# Patient Record
Sex: MALE | Race: WHITE | NOT HISPANIC OR LATINO | Employment: STUDENT | URBAN - METROPOLITAN AREA
[De-identification: names, ages, dates, MRNs, and addresses within clinical notes are randomized per-mention and may not be internally consistent; named-entity substitution may affect disease eponyms.]

---

## 2024-09-10 ENCOUNTER — OFFICE VISIT (OUTPATIENT)
Dept: FAMILY MEDICINE CLINIC | Facility: CLINIC | Age: 24
End: 2024-09-10
Payer: COMMERCIAL

## 2024-09-10 VITALS
HEIGHT: 74 IN | BODY MASS INDEX: 24.77 KG/M2 | HEART RATE: 80 BPM | WEIGHT: 193 LBS | DIASTOLIC BLOOD PRESSURE: 74 MMHG | SYSTOLIC BLOOD PRESSURE: 116 MMHG | OXYGEN SATURATION: 98 % | TEMPERATURE: 98.2 F

## 2024-09-10 DIAGNOSIS — R07.89 MUSCULOSKELETAL CHEST PAIN: Primary | ICD-10-CM

## 2024-09-10 DIAGNOSIS — M99.08 RIB CAGE DYSFUNCTION: ICD-10-CM

## 2024-09-10 PROCEDURE — 99203 OFFICE O/P NEW LOW 30 MIN: CPT | Performed by: FAMILY MEDICINE

## 2024-09-10 RX ORDER — METHYLPREDNISOLONE 4 MG
TABLET, DOSE PACK ORAL
COMMUNITY
Start: 2024-06-11 | End: 2024-09-10

## 2024-09-10 RX ORDER — TRIAMCINOLONE ACETONIDE 5 MG/G
CREAM TOPICAL
COMMUNITY
Start: 2024-06-12 | End: 2024-09-10

## 2024-09-10 NOTE — ASSESSMENT & PLAN NOTE
I suspect patient's chest pain is likely musculoskeletal in nature.  It does not worsen with exertion and only occurs with certain movements.  Pain could be reproduced in clinic today with overhead movements.  I suspect pain secondary to pectoral muscle strain or rib dysfunction.  Patient can try ibuprofen and Tylenol as needed for pain control.  Patient was given referral to physical therapy and a chest x-ray was ordered for further evaluation and treatment.  RTC/ED precautions given.

## 2024-09-10 NOTE — PROGRESS NOTES
MAVERICK Voss is a 23 y.o. male who presents today to establish care.    Chief Complaint   Patient presents with    Establish Care    Chest Pain     Rib a sternum         Patient is here to establish care. He has been seeing pediatrician but has not been seen in a couple of years other than at [facility]. He had seizures as a kid but has not had one since he was 9 years old. He has been having chest pain since July. Pain is aching in nature and does not radiate. It occurs along the sternum worse on the left than the right. He will randomly get pain with doing push ups, raising hands over his head, and if he sleeps on his chest. Pain does not worsen with deep breaths. It started after he was moving a bunch of bags of quickcrete but does not recall an injury. Pain is not constant and only occurs with these movements. It has not gotten better or worse. It does not occur at rest and physical exertion does not cause or worsen pain unless he is doing a push up motion or has hands over head. Sometimes when he lifts hands above head and stretches backward it will pop and the pain will go away. He denies bruising, SOA, palpitations, weakness, N/V, wheezing, HA, or dizziness. He has not taken any medications for this. He has just been avoiding movements that cause the pain. He has no remote injury to sternum or rib cage. No history history of heart disease and does not have a strong family history of heart disease.       Review of Systems   Constitutional:  Negative for fever and unexpected weight loss.   HENT:  Negative for congestion, ear pain and sore throat.    Eyes:  Negative for visual disturbance.   Respiratory:  Negative for cough, chest tightness, shortness of breath and wheezing.    Cardiovascular:  Positive for chest pain. Negative for palpitations and leg swelling.   Gastrointestinal:  Negative for abdominal pain, blood in stool, constipation, diarrhea, nausea, vomiting and GERD.   Endocrine: Negative for polydipsia  and polyuria.   Genitourinary:  Negative for difficulty urinating.   Musculoskeletal:  Negative for joint swelling.   Skin:  Negative for rash and skin lesions.   Allergic/Immunologic: Negative for environmental allergies.   Neurological:  Negative for seizures and syncope.   Hematological:  Does not bruise/bleed easily.   Psychiatric/Behavioral:  Negative for suicidal ideas.         PHQ-9 Depression Screening  Little interest or pleasure in doing things? 0-->not at all   Feeling down, depressed, or hopeless? 0-->not at all   Trouble falling or staying asleep, or sleeping too much?     Feeling tired or having little energy?     Poor appetite or overeating?     Feeling bad about yourself - or that you are a failure or have let yourself or your family down?     Trouble concentrating on things, such as reading the newspaper or watching television?     Moving or speaking so slowly that other people could have noticed? Or the opposite - being so fidgety or restless that you have been moving around a lot more than usual?     Thoughts that you would be better off dead, or of hurting yourself in some way?     PHQ-9 Total Score 0   If you checked off any problems, how difficult have these problems made it for you to do your work, take care of things at home, or get along with other people?         Past Medical History:   Diagnosis Date    Seizures         History reviewed. No pertinent surgical history.     Family History   Problem Relation Age of Onset    No Known Problems Mother     Atrial fibrillation Father     No Known Problems Sister     Diabetes Maternal Grandmother     Other (Other) Maternal Grandfather         unknown    Heart disease Paternal Grandmother     Cancer Paternal Grandmother         unknown type    Lung cancer Paternal Grandfather         Social History     Socioeconomic History    Marital status: Single   Tobacco Use    Smoking status: Never    Smokeless tobacco: Never   Vaping Use    Vaping status:  "Never Used   Substance and Sexual Activity    Alcohol use: Yes     Comment: occationaly    Drug use: Never    Sexual activity: Yes     Partners: Female     Birth control/protection: Birth control pill     Comment: 1 female partner in the last year        Current Outpatient Medications on File Prior to Visit   Medication Sig Dispense Refill    [DISCONTINUED] methylPREDNISolone (MEDROL) 4 MG dose pack follow package directions (Patient not taking: Reported on 9/10/2024)      [DISCONTINUED] triamcinolone (KENALOG) 0.5 % cream APPLY TOPICALLY TO AFFECTED AREA TWICE DAILY X 2 WEEKS (Patient not taking: Reported on 9/10/2024)       No current facility-administered medications on file prior to visit.       No Known Allergies     Visit Vitals  /74   Pulse 80   Temp 98.2 °F (36.8 °C) (Infrared)   Ht 188 cm (74\")   Wt 87.5 kg (193 lb)   SpO2 98%   BMI 24.78 kg/m²      Body mass index is 24.78 kg/m².    Physical Exam  Constitutional:       General: He is not in acute distress.     Appearance: He is well-developed. He is not diaphoretic.   HENT:      Head: Atraumatic.   Cardiovascular:      Rate and Rhythm: Normal rate and regular rhythm.      Heart sounds: Normal heart sounds. No murmur heard.     No friction rub. No gallop.   Pulmonary:      Effort: Pulmonary effort is normal. No respiratory distress.      Breath sounds: Normal breath sounds. No stridor. No wheezing, rhonchi or rales.   Chest:      Chest wall: Tenderness (along left sternal border) present.   Abdominal:      General: Bowel sounds are normal. There is no distension.      Palpations: Abdomen is soft. There is no mass.      Tenderness: There is no abdominal tenderness. There is no guarding or rebound.      Hernia: No hernia is present.   Musculoskeletal:      Cervical back: Normal range of motion and neck supple.   Skin:     General: Skin is warm and dry.   Neurological:      Mental Status: He is alert and oriented to person, place, and time. "   Psychiatric:         Behavior: Behavior normal.          No results found for this or any previous visit.     Problems Addressed this Visit          Musculoskeletal and Injuries    Musculoskeletal chest pain - Primary     I suspect patient's chest pain is likely musculoskeletal in nature.  It does not worsen with exertion and only occurs with certain movements.  Pain could be reproduced in clinic today with overhead movements.  I suspect pain secondary to pectoral muscle strain or rib dysfunction.  Patient can try ibuprofen and Tylenol as needed for pain control.  Patient was given referral to physical therapy and a chest x-ray was ordered for further evaluation and treatment.  RTC/ED precautions given.         Relevant Orders    XR Ribs 2 View Left    Ambulatory Referral to Physical Therapy for Evaluation & Treatment    Rib cage dysfunction    Relevant Orders    XR Ribs 2 View Left    Ambulatory Referral to Physical Therapy for Evaluation & Treatment     Diagnoses         Codes Comments    Musculoskeletal chest pain    -  Primary ICD-10-CM: R07.89  ICD-9-CM: 786.59     Rib cage dysfunction     ICD-10-CM: M99.08  ICD-9-CM: 786.9             Return in about 3 months (around 12/10/2024) for Annual.    Owen Dyer MD  9/10/2024       91

## 2024-09-17 ENCOUNTER — TELEPHONE (OUTPATIENT)
Dept: FAMILY MEDICINE CLINIC | Facility: CLINIC | Age: 24
End: 2024-09-17
Payer: COMMERCIAL

## 2024-09-19 ENCOUNTER — TELEPHONE (OUTPATIENT)
Dept: PHYSICAL THERAPY | Facility: CLINIC | Age: 24
End: 2024-09-19

## 2024-12-10 ENCOUNTER — OFFICE VISIT (OUTPATIENT)
Dept: FAMILY MEDICINE CLINIC | Facility: CLINIC | Age: 24
End: 2024-12-10
Payer: COMMERCIAL

## 2024-12-10 VITALS
WEIGHT: 196 LBS | HEIGHT: 74 IN | SYSTOLIC BLOOD PRESSURE: 122 MMHG | DIASTOLIC BLOOD PRESSURE: 80 MMHG | BODY MASS INDEX: 25.15 KG/M2 | OXYGEN SATURATION: 97 % | TEMPERATURE: 98.2 F | HEART RATE: 88 BPM

## 2024-12-10 DIAGNOSIS — Z23 IMMUNIZATION DUE: ICD-10-CM

## 2024-12-10 DIAGNOSIS — Z00.00 WELL ADULT EXAM: Primary | ICD-10-CM

## 2024-12-10 PROBLEM — M99.08 RIB CAGE DYSFUNCTION: Status: RESOLVED | Noted: 2024-09-10 | Resolved: 2024-12-10

## 2024-12-10 PROBLEM — R07.89 MUSCULOSKELETAL CHEST PAIN: Status: RESOLVED | Noted: 2024-09-10 | Resolved: 2024-12-10

## 2024-12-10 PROCEDURE — 90471 IMMUNIZATION ADMIN: CPT | Performed by: FAMILY MEDICINE

## 2024-12-10 PROCEDURE — 99395 PREV VISIT EST AGE 18-39: CPT | Performed by: FAMILY MEDICINE

## 2024-12-10 PROCEDURE — 90715 TDAP VACCINE 7 YRS/> IM: CPT | Performed by: FAMILY MEDICINE

## 2024-12-10 NOTE — PROGRESS NOTES
MAVERICK Voss is a 24 y.o. male who presents today to complete annual exam.    Chief Complaint   Patient presents with    Annual Exam        Patient is here for annual. He has been exercising running 3-4 days a week for 2 miles a day. He eats a generally healthy and varied diet. He sees the dentist twice a year. He sees the optometrist every few years. He sees the dermatologist every 2 years. He has no acute complaints today.            Review of Systems   Constitutional:  Negative for fever and unexpected weight loss.   HENT:  Negative for congestion, ear pain and sore throat.    Eyes:  Negative for visual disturbance.   Respiratory:  Negative for cough, shortness of breath and wheezing.    Cardiovascular:  Negative for chest pain and palpitations.   Gastrointestinal:  Negative for abdominal pain, blood in stool, constipation, diarrhea, nausea, vomiting and GERD.   Endocrine: Negative for polydipsia and polyuria.   Genitourinary:  Negative for difficulty urinating.   Musculoskeletal:  Negative for joint swelling.   Skin:  Negative for rash and skin lesions.   Allergic/Immunologic: Negative for environmental allergies.   Neurological:  Negative for seizures and syncope.   Hematological:  Does not bruise/bleed easily.   Psychiatric/Behavioral:  Negative for suicidal ideas.             9/10/2024     1:54 PM   PHQ-2/PHQ-9 Depression Screening   Retired Little Interest or Pleasure in Doing Things 0-->not at all   Retired Feeling Down, Depressed or Hopeless 0-->not at all   Retired PHQ-9: Brief Depression Severity Measure Score 0           Past Medical History:   Diagnosis Date    Seizures         History reviewed. No pertinent surgical history.     Family History   Problem Relation Age of Onset    No Known Problems Mother     Atrial fibrillation Father     No Known Problems Sister     Diabetes Maternal Grandmother     Other (Other) Maternal Grandfather         unknown    Heart disease Paternal Grandmother     Cancer  "Paternal Grandmother         unknown type    Lung cancer Paternal Grandfather         Social History     Socioeconomic History    Marital status: Single   Tobacco Use    Smoking status: Never    Smokeless tobacco: Never   Vaping Use    Vaping status: Never Used   Substance and Sexual Activity    Alcohol use: Yes     Comment: occationaly    Drug use: Never    Sexual activity: Yes     Partners: Female     Birth control/protection: Birth control pill     Comment: 1 female partner in the last year        No current outpatient medications on file prior to visit.     No current facility-administered medications on file prior to visit.       No Known Allergies     Visit Vitals  /80   Pulse 88   Temp 98.2 °F (36.8 °C) (Infrared)   Ht 188 cm (74.02\")   Wt 88.9 kg (196 lb)   SpO2 97%   BMI 25.15 kg/m²      Body mass index is 25.15 kg/m².    Physical Exam  Constitutional:       General: He is not in acute distress.     Appearance: He is well-developed. He is not diaphoretic.   HENT:      Head: Atraumatic.   Cardiovascular:      Rate and Rhythm: Normal rate and regular rhythm.      Heart sounds: Normal heart sounds. No murmur heard.     No friction rub. No gallop.   Pulmonary:      Effort: Pulmonary effort is normal. No respiratory distress.      Breath sounds: Normal breath sounds. No stridor. No wheezing, rhonchi or rales.   Abdominal:      General: Bowel sounds are normal. There is no distension.      Palpations: Abdomen is soft. There is no mass.      Tenderness: There is no abdominal tenderness. There is no guarding or rebound.      Hernia: No hernia is present.   Musculoskeletal:      Cervical back: Normal range of motion and neck supple.   Skin:     General: Skin is warm and dry.   Neurological:      Mental Status: He is alert and oriented to person, place, and time.   Psychiatric:         Behavior: Behavior normal.          No results found for this or any previous visit.     Problems Addressed this Visit          " Health Encounters    Well adult exam - Primary     The patient is here for health maintenance visit.  Currently, the patient consumes a healthy diet and has an adequate exercise regimen.  Screening lab work is ordered.  Immunizations were reviewed today.  Advice and education was given regarding nutrition, aerobic exercise, routine dental evaluations, routine eye exams, reproductive health, cardiovascular risk reduction, sunscreen use, self skin examination (annual dermatology evaluations) and seatbelt use (general overall safety).  Further recommendations will be given if needed after lab evaluation.  Annual wellness evaluation is recommended.           Relevant Orders    Comprehensive Metabolic Panel    CBC & Differential    Lipid Panel    Hemoglobin A1c     Other Visit Diagnoses       Immunization due        Relevant Orders    Tdap Vaccine Greater Than or Equal To 6yo IM          Diagnoses         Codes Comments    Well adult exam    -  Primary ICD-10-CM: Z00.00  ICD-9-CM: V70.0     Immunization due     ICD-10-CM: Z23  ICD-9-CM: V05.9             Return in about 1 year (around 12/10/2025) for Annual.    Owen Dyer MD  12/10/2024

## 2024-12-10 NOTE — LETTER
[facility]  Vaccine Consent Form    Patient Name:  MAVERICK Voss  Patient :  2000     Vaccine(s) Ordered    Tdap Vaccine Greater Than or Equal To 6yo IM        Screening Checklist  The following questions should be completed prior to vaccination. If you answer “yes” to any question, it does not necessarily mean you should not be vaccinated. It just means we may need to clarify or ask more questions. If a question is unclear, please ask your healthcare provider to explain it.    Yes No   Any fever or moderate to severe illness today (mild illness and/or antibiotic treatment are not contraindications)?     Do you have a history of a serious reaction to any previous vaccinations, such as anaphylaxis, encephalopathy within 7 days, Guillain-[place] syndrome within 6 weeks, seizure?     Have you received any live vaccine(s) (e.g MMR, RICK) or any other vaccines in the last month (to ensure duplicate doses aren't given)?     Do you have an anaphylactic allergy to latex (DTaP, DTaP-IPV, Hep A, Hep B, MenB, RV, Td, Tdap), baker’s yeast (Hep B, HPV), polysorbates (RSV, nirsevimab, PCV 20, Rotavirrus, Tdap, Shingrix), or gelatin (RICK, MMR)?     Do you have an anaphylactic allergy to neomycin (Rabies, RICK, MMR, IPV, Hep A), polymyxin B (IPV), or streptomycin (IPV)?      Any cancer, leukemia, AIDS, or other immune system disorder? (RICK, MMR, RV)     Do you have a parent, brother, or sister with an immune system problem (if immune competence of vaccine recipient clinically verified, can proceed)? (MMR, RICK)     Any recent steroid treatments for >2 weeks, chemotherapy, or radiation treatment? (RICK, MMR)     Have you received antibody-containing blood transfusions or IVIG in the past 11 months (recommended interval is dependent on product)? (MMR, RICK)     Have you taken antiviral drugs (acyclovir, famciclovir, valacyclovir for RICK) in the last 24 or 48 hours, respectively?      Are you pregnant or planning to become  "pregnant within 1 month? (RICK, MMR, HPV, IPV, MenB, Abrexvy; For Hep B- refer to Engerix-B; For RSV - Abrysvo is indicated for 32-36 weeks of pregnancy from September to January)     For infants, have you ever been told your child has had intussusception or a medical emergency involving obstruction of the intestine (Rotavirus)? If not for an infant, can skip this question.         *Ordering Physicians/APC should be consulted if \"yes\" is checked by the patient or guardian above.  I have received, read, and understand the Vaccine Information Statement (VIS) for each vaccine ordered.  I have considered my or my child's health status as well as the health status of my close contacts.  I have taken the opportunity to discuss my vaccine questions with my or my child's health care provider.   I have requested that the ordered vaccine(s) be given to me or my child.  I understand the benefits and risks of the vaccines.  I understand that I should remain in the clinic for 15 minutes after receiving the vaccine(s).  _________________________________________________________  Signature of Patient or Parent/Legal Guardian ____________________  Date     "